# Patient Record
Sex: MALE | Race: WHITE | NOT HISPANIC OR LATINO | URBAN - METROPOLITAN AREA
[De-identification: names, ages, dates, MRNs, and addresses within clinical notes are randomized per-mention and may not be internally consistent; named-entity substitution may affect disease eponyms.]

---

## 2019-12-20 ENCOUNTER — EMERGENCY (EMERGENCY)
Facility: HOSPITAL | Age: 69
LOS: 1 days | Discharge: HOME | End: 2019-12-20
Attending: EMERGENCY MEDICINE
Payer: MEDICARE

## 2019-12-20 VITALS
SYSTOLIC BLOOD PRESSURE: 162 MMHG | TEMPERATURE: 98 F | DIASTOLIC BLOOD PRESSURE: 102 MMHG | HEART RATE: 78 BPM | RESPIRATION RATE: 16 BRPM | OXYGEN SATURATION: 99 %

## 2019-12-20 VITALS
DIASTOLIC BLOOD PRESSURE: 108 MMHG | WEIGHT: 149.91 LBS | SYSTOLIC BLOOD PRESSURE: 171 MMHG | RESPIRATION RATE: 18 BRPM | HEART RATE: 72 BPM | OXYGEN SATURATION: 100 % | TEMPERATURE: 98 F | HEIGHT: 69.5 IN

## 2019-12-20 LAB
ALBUMIN SERPL ELPH-MCNC: 4.8 G/DL — SIGNIFICANT CHANGE UP (ref 3.3–5)
ALP SERPL-CCNC: 72 U/L — SIGNIFICANT CHANGE UP (ref 40–120)
ALT FLD-CCNC: 19 U/L — SIGNIFICANT CHANGE UP (ref 10–45)
ANION GAP SERPL CALC-SCNC: 14 MMOL/L — SIGNIFICANT CHANGE UP (ref 5–17)
AST SERPL-CCNC: 27 U/L — SIGNIFICANT CHANGE UP (ref 10–40)
BASE EXCESS BLDV CALC-SCNC: 2.8 MMOL/L — HIGH (ref -2–2)
BASOPHILS # BLD AUTO: 0.04 K/UL — SIGNIFICANT CHANGE UP (ref 0–0.2)
BASOPHILS NFR BLD AUTO: 0.6 % — SIGNIFICANT CHANGE UP (ref 0–2)
BILIRUB SERPL-MCNC: 0.9 MG/DL — SIGNIFICANT CHANGE UP (ref 0.2–1.2)
BUN SERPL-MCNC: 15 MG/DL — SIGNIFICANT CHANGE UP (ref 7–23)
CA-I SERPL-SCNC: 1.24 MMOL/L — SIGNIFICANT CHANGE UP (ref 1.12–1.3)
CALCIUM SERPL-MCNC: 10.4 MG/DL — SIGNIFICANT CHANGE UP (ref 8.4–10.5)
CHLORIDE BLDV-SCNC: 102 MMOL/L — SIGNIFICANT CHANGE UP (ref 96–108)
CHLORIDE SERPL-SCNC: 97 MMOL/L — SIGNIFICANT CHANGE UP (ref 96–108)
CO2 BLDV-SCNC: 31 MMOL/L — HIGH (ref 22–30)
CO2 SERPL-SCNC: 25 MMOL/L — SIGNIFICANT CHANGE UP (ref 22–31)
CREAT SERPL-MCNC: 1.11 MG/DL — SIGNIFICANT CHANGE UP (ref 0.5–1.3)
EOSINOPHIL # BLD AUTO: 0.14 K/UL — SIGNIFICANT CHANGE UP (ref 0–0.5)
EOSINOPHIL NFR BLD AUTO: 2 % — SIGNIFICANT CHANGE UP (ref 0–6)
GAS PNL BLDV: 140 MMOL/L — SIGNIFICANT CHANGE UP (ref 135–145)
GAS PNL BLDV: SIGNIFICANT CHANGE UP
GAS PNL BLDV: SIGNIFICANT CHANGE UP
GLUCOSE BLDV-MCNC: 88 MG/DL — SIGNIFICANT CHANGE UP (ref 70–99)
GLUCOSE SERPL-MCNC: 92 MG/DL — SIGNIFICANT CHANGE UP (ref 70–99)
HCO3 BLDV-SCNC: 29 MMOL/L — SIGNIFICANT CHANGE UP (ref 21–29)
HCT VFR BLD CALC: 48.6 % — SIGNIFICANT CHANGE UP (ref 39–50)
HCT VFR BLDA CALC: 51 % — HIGH (ref 39–50)
HGB BLD CALC-MCNC: 16.6 G/DL — SIGNIFICANT CHANGE UP (ref 13–17)
HGB BLD-MCNC: 16.3 G/DL — SIGNIFICANT CHANGE UP (ref 13–17)
IMM GRANULOCYTES NFR BLD AUTO: 0.1 % — SIGNIFICANT CHANGE UP (ref 0–1.5)
LACTATE BLDV-MCNC: 1.9 MMOL/L — SIGNIFICANT CHANGE UP (ref 0.7–2)
LIDOCAIN IGE QN: 28 U/L — SIGNIFICANT CHANGE UP (ref 7–60)
LYMPHOCYTES # BLD AUTO: 1.29 K/UL — SIGNIFICANT CHANGE UP (ref 1–3.3)
LYMPHOCYTES # BLD AUTO: 18.7 % — SIGNIFICANT CHANGE UP (ref 13–44)
MCHC RBC-ENTMCNC: 30.4 PG — SIGNIFICANT CHANGE UP (ref 27–34)
MCHC RBC-ENTMCNC: 33.5 GM/DL — SIGNIFICANT CHANGE UP (ref 32–36)
MCV RBC AUTO: 90.7 FL — SIGNIFICANT CHANGE UP (ref 80–100)
MONOCYTES # BLD AUTO: 0.51 K/UL — SIGNIFICANT CHANGE UP (ref 0–0.9)
MONOCYTES NFR BLD AUTO: 7.4 % — SIGNIFICANT CHANGE UP (ref 2–14)
NEUTROPHILS # BLD AUTO: 4.91 K/UL — SIGNIFICANT CHANGE UP (ref 1.8–7.4)
NEUTROPHILS NFR BLD AUTO: 71.2 % — SIGNIFICANT CHANGE UP (ref 43–77)
NRBC # BLD: 0 /100 WBCS — SIGNIFICANT CHANGE UP (ref 0–0)
PCO2 BLDV: 53 MMHG — HIGH (ref 35–50)
PH BLDV: 7.36 — SIGNIFICANT CHANGE UP (ref 7.35–7.45)
PLATELET # BLD AUTO: 193 K/UL — SIGNIFICANT CHANGE UP (ref 150–400)
PO2 BLDV: 22 MMHG — LOW (ref 25–45)
POTASSIUM BLDV-SCNC: 3.7 MMOL/L — SIGNIFICANT CHANGE UP (ref 3.5–5.3)
POTASSIUM SERPL-MCNC: 3.7 MMOL/L — SIGNIFICANT CHANGE UP (ref 3.5–5.3)
POTASSIUM SERPL-SCNC: 3.7 MMOL/L — SIGNIFICANT CHANGE UP (ref 3.5–5.3)
PROT SERPL-MCNC: 8.3 G/DL — SIGNIFICANT CHANGE UP (ref 6–8.3)
RBC # BLD: 5.36 M/UL — SIGNIFICANT CHANGE UP (ref 4.2–5.8)
RBC # FLD: 13 % — SIGNIFICANT CHANGE UP (ref 10.3–14.5)
SAO2 % BLDV: 29 % — LOW (ref 67–88)
SODIUM SERPL-SCNC: 136 MMOL/L — SIGNIFICANT CHANGE UP (ref 135–145)
WBC # BLD: 6.9 K/UL — SIGNIFICANT CHANGE UP (ref 3.8–10.5)
WBC # FLD AUTO: 6.9 K/UL — SIGNIFICANT CHANGE UP (ref 3.8–10.5)

## 2019-12-20 PROCEDURE — 83605 ASSAY OF LACTIC ACID: CPT

## 2019-12-20 PROCEDURE — 84132 ASSAY OF SERUM POTASSIUM: CPT

## 2019-12-20 PROCEDURE — 74177 CT ABD & PELVIS W/CONTRAST: CPT | Mod: 26

## 2019-12-20 PROCEDURE — 82803 BLOOD GASES ANY COMBINATION: CPT

## 2019-12-20 PROCEDURE — 84295 ASSAY OF SERUM SODIUM: CPT

## 2019-12-20 PROCEDURE — 99284 EMERGENCY DEPT VISIT MOD MDM: CPT | Mod: 25

## 2019-12-20 PROCEDURE — 82947 ASSAY GLUCOSE BLOOD QUANT: CPT

## 2019-12-20 PROCEDURE — 85014 HEMATOCRIT: CPT

## 2019-12-20 PROCEDURE — 80053 COMPREHEN METABOLIC PANEL: CPT

## 2019-12-20 PROCEDURE — 85027 COMPLETE CBC AUTOMATED: CPT

## 2019-12-20 PROCEDURE — 74177 CT ABD & PELVIS W/CONTRAST: CPT

## 2019-12-20 PROCEDURE — 82435 ASSAY OF BLOOD CHLORIDE: CPT

## 2019-12-20 PROCEDURE — 81001 URINALYSIS AUTO W/SCOPE: CPT

## 2019-12-20 PROCEDURE — 82330 ASSAY OF CALCIUM: CPT

## 2019-12-20 PROCEDURE — 99284 EMERGENCY DEPT VISIT MOD MDM: CPT | Mod: GC

## 2019-12-20 PROCEDURE — 83690 ASSAY OF LIPASE: CPT

## 2019-12-20 NOTE — ED PROVIDER NOTE - CLINICAL SUMMARY MEDICAL DECISION MAKING FREE TEXT BOX
69M h/o SBO (conservative treatment) 4-5y ago, constipation, Crohns not on meds, p/w upper abd pain x 4-5d. nontoxic appearing with nonfocal abd exam. no pulseating masses on exam. r/o sbo v constiapiton v crohns flare (less likely)

## 2019-12-20 NOTE — ED PROVIDER NOTE - PHYSICAL EXAMINATION
Vitals: WNL  Gen: laying comfortably in NAD  Head: NCAT  ENT: sclerae white, anicterus, moist mucous membranes. No exudates.  CV: RRR. Audible S1 and S2. No murmurs, rubs, gallops, S3, nor S4, 2+ radial and DP pulses   Pulm: Clear to auscultation bilaterally. No wheezes, rales, or rhonchi  Abd: soft, normoactive BS x4, NTND, no rebound, no guarding, no rashes  Musculoskeletal:  No peripheral edema  Skin: no lesions or scars noted  Neurologic: AAOx3  : no CVA tenderness  Psych: no SI/HI

## 2019-12-20 NOTE — ED PROVIDER NOTE - NSFOLLOWUPINSTRUCTIONS_ED_ALL_ED_FT
1) You have acute on chronic Crohn's flare. Please follow-up with your GI within the next 3 days to discuss if you need to be started on long term medications.  Please call today or tomorrow for an appointment.  If you cannot follow-up with your doctor(s), please return to the ED for any urgent issues.  2) If you have any worsening of symptoms or any other concerns please return to the ED immediately.  3) Please continue taking your home medications as directed.  4) You may have been given a copy of your labs and/or imaging.  Please go over these with your primary care doctor.

## 2019-12-20 NOTE — ED ADULT NURSE NOTE - OBJECTIVE STATEMENT
Patient is a 70 y/o male c/o upper abdominal pain x3 days. States pain is in bilateral upper abdominal quadrants and radiates to flank. States pain is subtle but GI told him to come to ED r/t hx of SBO 4 years ago. States he did not receive surgery for SBO. States last bowel movement was Sunday which was formed/some diarrhea. States he was diagnosed with crohns but has had no pain/issues r/t it since. Hx HTN, did not take night medications. Denies CP, SOB, N/V/D, numbness, tingling, fever, cough, chills, weakness, dizziness, headache. A&Ox3. Breathing unlabored and spontaneously. Abdomen soft, nontender and nondistended. Full ROM and strength of extremities. Safety and comfort measures provided. Family at bedside.

## 2019-12-20 NOTE — ED PROVIDER NOTE - PATIENT PORTAL LINK FT
You can access the FollowMyHealth Patient Portal offered by [facility] by registering at the following website: [email or web address]. By joining [facility]’s FollowMyHealth portal, you will also be able to view your health information using other applications (apps) compatible with our system.

## 2019-12-20 NOTE — ED PROVIDER NOTE - NS ED ROS FT
Gen: No fever, normal appetite  Eyes: No eye irritation or discharge  ENT: No earpain, congestion, sore throat  Resp: No cough or trouble breathing  Cardiovascular: No chest pain or palpitation  Gastroenteric: +abd pain  : No dysuria  MS: No joint or muscle pain  Skin: No rashes  Neuro: No headache  Remainder negative, except as per the HPI

## 2019-12-20 NOTE — ED PROVIDER NOTE - OBJECTIVE STATEMENT
69M h/o SBO (conservative treatment) 4-5y ago, constipation, Crohns not on meds, p/w upper abd pain x 4-5d. pain intermittent, mild in nature, radaites around abd to back, not a/w hematuria, n/v. decreased flatus. pain not a/w eating, only positional. has not had BM x 4-5d which pt says is typical for him. denies melena. only surgery - prostatectomy for prostate ca (not in remission)

## 2019-12-20 NOTE — ED ADULT NURSE REASSESSMENT NOTE - NS ED NURSE REASSESS COMMENT FT1
Report received from Whitney GARCIA. Pt A&Ox3 calm and cooperative, Pt ambulatory to bathroom without assistance, Pt states he has no ABD pain or urinary symptoms, Pt wasn't able to move bowels since Sunday. Denies chest pain, sob, ha, n/v/d, abdominal pain, f/c, urinary symptoms, hematuria. Upon examination, full facial symmetry,  no JVD or trach deviation, lung sounds clear and adequate chest rise, S1 and S2 heart sounds present, +2 pulses in all extremities with full ROM and equal strength, cap refill <2 seconds, ABD soft, non distended and non tender.

## 2019-12-20 NOTE — ED PROVIDER NOTE - PROGRESS NOTE DETAILS
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 pt continues to be well appearing without abd pain. pt requesting to leave. pt understands he needs close fu with GI. wioll dc home. Talha Curiel MD: was able to reach Dr Pete after pt had left the ED. LM with pt's cell for return call back for pharmacy information. Dr Pete recommending prednisone 30mg x 5d and f/u with him in office.

## 2019-12-20 NOTE — ED PROVIDER NOTE - ATTENDING CONTRIBUTION TO CARE
I have seen and evaluated this patient with the resident.   I agree with the findings  unless other wise stated.  I have made appropriate changes in documentations where needed, After my face to face bedside evaluation, I am further  notinM h/o SBO (conservative treatment) 4-5y ago, constipation, Crohns not on meds, p/w upper abd pain x 4-5d. pain intermittent, mild in nature, radaites around abd to back, not a/w hematuria, n/v. decreased flatus. pain not a/w eating, only positional. has not had BM x 4-5d which pt says is typical for him. denies melena. only surgery - prostatectomy for prostate ca (not in remission) Pt afebrile affirms abdomen pain No fever no vomiting Abdomen soft not distended has tender lower abdome Rt>Lt CT reveals Crohns disease changes pt declines ion hospital care will d/w PMD --Manolo

## 2019-12-21 LAB
APPEARANCE UR: CLEAR — SIGNIFICANT CHANGE UP
BACTERIA # UR AUTO: NEGATIVE — SIGNIFICANT CHANGE UP
BILIRUB UR-MCNC: NEGATIVE — SIGNIFICANT CHANGE UP
COLOR SPEC: COLORLESS — SIGNIFICANT CHANGE UP
DIFF PNL FLD: NEGATIVE — SIGNIFICANT CHANGE UP
EPI CELLS # UR: 0 /HPF — SIGNIFICANT CHANGE UP
GLUCOSE UR QL: NEGATIVE — SIGNIFICANT CHANGE UP
HYALINE CASTS # UR AUTO: 0 /LPF — SIGNIFICANT CHANGE UP (ref 0–2)
KETONES UR-MCNC: ABNORMAL
LEUKOCYTE ESTERASE UR-ACNC: NEGATIVE — SIGNIFICANT CHANGE UP
NITRITE UR-MCNC: NEGATIVE — SIGNIFICANT CHANGE UP
PH UR: 6.5 — SIGNIFICANT CHANGE UP (ref 5–8)
PROT UR-MCNC: NEGATIVE — SIGNIFICANT CHANGE UP
RBC CASTS # UR COMP ASSIST: 0 /HPF — SIGNIFICANT CHANGE UP (ref 0–4)
SP GR SPEC: 1.01 — LOW (ref 1.01–1.02)
UROBILINOGEN FLD QL: NEGATIVE — SIGNIFICANT CHANGE UP
WBC UR QL: 0 /HPF — SIGNIFICANT CHANGE UP (ref 0–5)

## 2020-11-03 ENCOUNTER — TRANSCRIPTION ENCOUNTER (OUTPATIENT)
Age: 70
End: 2020-11-03

## 2023-09-19 ENCOUNTER — OFFICE (OUTPATIENT)
Dept: URBAN - METROPOLITAN AREA CLINIC 70 | Facility: CLINIC | Age: 73
Setting detail: OPHTHALMOLOGY
End: 2023-09-19
Payer: MEDICARE

## 2023-09-19 DIAGNOSIS — H35.54: ICD-10-CM

## 2023-09-19 DIAGNOSIS — H25.13: ICD-10-CM

## 2023-09-19 DIAGNOSIS — H16.223: ICD-10-CM

## 2023-09-19 DIAGNOSIS — H40.033: ICD-10-CM

## 2023-09-19 DIAGNOSIS — H35.40: ICD-10-CM

## 2023-09-19 PROCEDURE — 92014 COMPRE OPH EXAM EST PT 1/>: CPT | Performed by: OPHTHALMOLOGY

## 2023-09-19 ASSESSMENT — REFRACTION_CURRENTRX
OS_CYLINDER: SPH
OS_SPHERE: +2.00
OS_OVR_VA: 20/
OD_SPHERE: +2.00
OD_CYLINDER: SPH
OD_VPRISM_DIRECTION: SV
OS_VPRISM_DIRECTION: SV
OD_OVR_VA: 20/

## 2023-09-19 ASSESSMENT — REFRACTION_AUTOREFRACTION
OS_AXIS: 083
OS_SPHERE: +2.75
OD_SPHERE: +2.50
OD_AXIS: 101
OD_CYLINDER: -1.75
OS_CYLINDER: -2.25

## 2023-09-19 ASSESSMENT — KERATOMETRY
OS_K1POWER_DIOPTERS: 45.75
OS_AXISANGLE_DEGREES: 149
OD_K1POWER_DIOPTERS: 45.25
OS_K2POWER_DIOPTERS: 46.25
OD_K2POWER_DIOPTERS: 45.75
OD_AXISANGLE_DEGREES: 044

## 2023-09-19 ASSESSMENT — REFRACTION_MANIFEST
OS_SPHERE: +2.00
OD_SPHERE: +1.75
OS_AXIS: 85
OD_CYLINDER: -0.75
OS_ADD: +2.25
OS_VA1: 20/20
OU_VA: 20/20
OD_VA2: 20/20
OD_VA1: 20/20
OS_VA2: 20/20
OD_ADD: +2.25
OD_AXIS: 100
OS_CYLINDER: -0.75

## 2023-09-19 ASSESSMENT — SPHEQUIV_DERIVED
OS_SPHEQUIV: 1.625
OD_SPHEQUIV: 1.375
OS_SPHEQUIV: 1.625
OD_SPHEQUIV: 1.625

## 2023-09-19 ASSESSMENT — AXIALLENGTH_DERIVED
OD_AL: 22.2991
OS_AL: 22.1362
OD_AL: 22.3865
OS_AL: 22.1362

## 2023-09-19 ASSESSMENT — VISUAL ACUITY
OS_BCVA: 20/40-2
OD_BCVA: 20/50

## 2023-09-19 ASSESSMENT — SUPERFICIAL PUNCTATE KERATITIS (SPK)
OD_SPK: 1+
OS_SPK: 1+

## 2023-09-19 ASSESSMENT — CONFRONTATIONAL VISUAL FIELD TEST (CVF)
OS_FINDINGS: FULL
OD_FINDINGS: FULL

## 2024-01-08 ENCOUNTER — RX ONLY (RX ONLY)
Age: 74
End: 2024-01-08

## 2024-01-08 ENCOUNTER — OFFICE (OUTPATIENT)
Dept: URBAN - METROPOLITAN AREA CLINIC 63 | Facility: CLINIC | Age: 74
Setting detail: OPHTHALMOLOGY
End: 2024-01-08
Payer: MEDICARE

## 2024-01-08 DIAGNOSIS — H10.45: ICD-10-CM

## 2024-01-08 PROCEDURE — 92012 INTRM OPH EXAM EST PATIENT: CPT | Performed by: STUDENT IN AN ORGANIZED HEALTH CARE EDUCATION/TRAINING PROGRAM

## 2024-01-08 ASSESSMENT — REFRACTION_MANIFEST
OS_SPHERE: +2.00
OD_VA2: 20/20
OD_SPHERE: +1.75
OS_AXIS: 85
OD_ADD: +2.25
OS_VA2: 20/20
OD_AXIS: 100
OD_CYLINDER: -0.75
OU_VA: 20/20
OD_VA1: 20/20
OS_VA1: 20/20
OS_CYLINDER: -0.75
OS_ADD: +2.25

## 2024-01-08 ASSESSMENT — REFRACTION_AUTOREFRACTION
OD_SPHERE: +2.25
OS_AXIS: 070
OS_SPHERE: +2.50
OD_CYLINDER: -1.25
OD_AXIS: 107
OS_CYLINDER: -1.50

## 2024-01-08 ASSESSMENT — REFRACTION_CURRENTRX
OD_OVR_VA: 20/
OD_SPHERE: +2.00
OS_SPHERE: +2.00
OS_CYLINDER: SPH
OD_CYLINDER: SPH
OS_VPRISM_DIRECTION: SV
OD_VPRISM_DIRECTION: SV
OS_OVR_VA: 20/

## 2024-01-08 ASSESSMENT — SPHEQUIV_DERIVED
OS_SPHEQUIV: 1.75
OD_SPHEQUIV: 1.375
OS_SPHEQUIV: 1.625
OD_SPHEQUIV: 1.625

## 2024-01-08 ASSESSMENT — CONFRONTATIONAL VISUAL FIELD TEST (CVF)
OD_FINDINGS: FULL
OS_FINDINGS: FULL

## 2024-01-08 ASSESSMENT — SUPERFICIAL PUNCTATE KERATITIS (SPK)
OS_SPK: 1+
OD_SPK: 1+

## 2024-09-25 ENCOUNTER — OFFICE (OUTPATIENT)
Dept: URBAN - METROPOLITAN AREA CLINIC 70 | Facility: CLINIC | Age: 74
Setting detail: OPHTHALMOLOGY
End: 2024-09-25
Payer: MEDICARE

## 2024-09-25 DIAGNOSIS — H40.033: ICD-10-CM

## 2024-09-25 DIAGNOSIS — H35.54: ICD-10-CM

## 2024-09-25 DIAGNOSIS — H25.13: ICD-10-CM

## 2024-09-25 DIAGNOSIS — H10.45: ICD-10-CM

## 2024-09-25 DIAGNOSIS — H16.223: ICD-10-CM

## 2024-09-25 DIAGNOSIS — H35.40: ICD-10-CM

## 2024-09-25 PROCEDURE — 92014 COMPRE OPH EXAM EST PT 1/>: CPT | Performed by: OPHTHALMOLOGY

## 2024-09-25 ASSESSMENT — CONFRONTATIONAL VISUAL FIELD TEST (CVF)
OD_FINDINGS: FULL
OS_FINDINGS: FULL

## 2024-11-07 ENCOUNTER — OFFICE (OUTPATIENT)
Dept: URBAN - METROPOLITAN AREA CLINIC 70 | Facility: CLINIC | Age: 74
Setting detail: OPHTHALMOLOGY
End: 2024-11-07
Payer: MEDICARE

## 2024-11-07 ENCOUNTER — RX ONLY (RX ONLY)
Age: 74
End: 2024-11-07

## 2024-11-07 DIAGNOSIS — H10.45: ICD-10-CM

## 2024-11-07 DIAGNOSIS — H40.033: ICD-10-CM

## 2024-11-07 DIAGNOSIS — H16.223: ICD-10-CM

## 2024-11-07 PROCEDURE — 92012 INTRM OPH EXAM EST PATIENT: CPT | Performed by: OPHTHALMOLOGY

## 2024-11-07 PROCEDURE — 92133 CPTRZD OPH DX IMG PST SGM ON: CPT | Performed by: OPHTHALMOLOGY

## 2024-11-07 ASSESSMENT — KERATOMETRY
OS_K2POWER_DIOPTERS: 46.25
OD_AXISANGLE_DEGREES: 026
OS_AXISANGLE_DEGREES: 141
OS_K1POWER_DIOPTERS: 45.75
OD_K2POWER_DIOPTERS: 46.00
OD_K1POWER_DIOPTERS: 45.50

## 2024-11-07 ASSESSMENT — REFRACTION_CURRENTRX
OS_ADD: +1.75
OS_OVR_VA: 20/
OS_SPHERE: +2.00
OS_SPHERE: +2.00
OD_CYLINDER: SPH
OD_OVR_VA: 20/
OS_CYLINDER: -0.75
OS_CYLINDER: SPH
OD_SPHERE: +2.50
OD_VPRISM_DIRECTION: SV
OS_AXIS: 078
OS_VPRISM_DIRECTION: SV
OD_CYLINDER: -0.75
OD_SPHERE: +2.00
OD_ADD: +1.25
OS_OVR_VA: 20/
OD_OVR_VA: 20/
OD_AXIS: 084

## 2024-11-07 ASSESSMENT — VISUAL ACUITY
OD_BCVA: 20/30+2
OS_BCVA: 20/30-

## 2024-11-07 ASSESSMENT — REFRACTION_AUTOREFRACTION
OS_AXIS: 077
OS_SPHERE: +2.75
OD_CYLINDER: -1.50
OD_SPHERE: +2.25
OS_CYLINDER: -1.75
OD_AXIS: 098

## 2024-11-07 ASSESSMENT — CONFRONTATIONAL VISUAL FIELD TEST (CVF)
OD_FINDINGS: FULL
OS_FINDINGS: FULL

## 2024-11-07 ASSESSMENT — REFRACTION_MANIFEST
OS_CYLINDER: -0.75
OD_CYLINDER: -0.75
OS_VA2: 20/20
OS_ADD: +2.25
OD_ADD: +2.25
OS_AXIS: 85
OD_AXIS: 100
OD_VA1: 20/20
OD_SPHERE: +1.75
OD_VA2: 20/20
OS_SPHERE: +2.00
OS_VA1: 20/20
OU_VA: 20/20

## 2024-11-07 ASSESSMENT — SUPERFICIAL PUNCTATE KERATITIS (SPK)
OD_SPK: 1+
OS_SPK: 1+

## 2025-04-01 ENCOUNTER — OFFICE (OUTPATIENT)
Dept: URBAN - METROPOLITAN AREA CLINIC 70 | Facility: CLINIC | Age: 75
Setting detail: OPHTHALMOLOGY
End: 2025-04-01
Payer: MEDICARE

## 2025-04-01 DIAGNOSIS — H10.45: ICD-10-CM

## 2025-04-01 DIAGNOSIS — H40.033: ICD-10-CM

## 2025-04-01 DIAGNOSIS — H16.223: ICD-10-CM

## 2025-04-01 PROCEDURE — 92012 INTRM OPH EXAM EST PATIENT: CPT | Performed by: OPHTHALMOLOGY

## 2025-04-01 PROCEDURE — 92083 EXTENDED VISUAL FIELD XM: CPT | Performed by: OPHTHALMOLOGY

## 2025-04-01 ASSESSMENT — REFRACTION_MANIFEST
OD_AXIS: 100
OS_SPHERE: +2.00
OD_SPHERE: +1.75
OS_ADD: +2.25
OS_VA2: 20/20
OS_VA1: 20/20
OD_CYLINDER: -0.75
OS_AXIS: 85
OU_VA: 20/20
OD_VA1: 20/20
OD_VA2: 20/20
OS_CYLINDER: -0.75
OD_ADD: +2.25

## 2025-04-01 ASSESSMENT — REFRACTION_CURRENTRX
OS_OVR_VA: 20/
OD_AXIS: 084
OD_CYLINDER: SPH
OD_VPRISM_DIRECTION: SV
OS_ADD: +1.75
OD_OVR_VA: 20/
OS_SPHERE: +2.00
OD_OVR_VA: 20/
OD_ADD: +1.25
OS_CYLINDER: -0.75
OD_SPHERE: +2.00
OD_CYLINDER: -0.75
OS_AXIS: 078
OS_VPRISM_DIRECTION: SV
OS_OVR_VA: 20/
OS_SPHERE: +2.00
OD_SPHERE: +2.50
OS_CYLINDER: SPH

## 2025-04-01 ASSESSMENT — KERATOMETRY
OD_AXISANGLE_DEGREES: 026
OD_K2POWER_DIOPTERS: 46.00
OS_K1POWER_DIOPTERS: 45.75
OS_AXISANGLE_DEGREES: 141
OD_K1POWER_DIOPTERS: 45.50
OS_K2POWER_DIOPTERS: 46.25

## 2025-04-01 ASSESSMENT — REFRACTION_AUTOREFRACTION
OS_AXIS: 077
OD_CYLINDER: -1.50
OD_SPHERE: +2.25
OD_AXIS: 098
OS_SPHERE: +2.75
OS_CYLINDER: -1.75

## 2025-04-01 ASSESSMENT — CONFRONTATIONAL VISUAL FIELD TEST (CVF)
OD_FINDINGS: FULL
OS_FINDINGS: FULL

## 2025-04-01 ASSESSMENT — SUPERFICIAL PUNCTATE KERATITIS (SPK)
OS_SPK: 1+
OD_SPK: 1+

## 2025-04-01 ASSESSMENT — VISUAL ACUITY
OD_BCVA: 20/30+2
OS_BCVA: 20/30-

## 2025-06-05 ENCOUNTER — OFFICE (OUTPATIENT)
Dept: URBAN - METROPOLITAN AREA CLINIC 70 | Facility: CLINIC | Age: 75
Setting detail: OPHTHALMOLOGY
End: 2025-06-05
Payer: MEDICARE

## 2025-06-05 DIAGNOSIS — H40.1112: ICD-10-CM

## 2025-06-05 PROCEDURE — 66030 INJECTION TREATMENT OF EYE: CPT | Mod: RT | Performed by: OPHTHALMOLOGY

## 2025-06-05 ASSESSMENT — SUPERFICIAL PUNCTATE KERATITIS (SPK)
OD_SPK: 1+
OS_SPK: 1+

## 2025-06-05 ASSESSMENT — CONFRONTATIONAL VISUAL FIELD TEST (CVF)
OS_FINDINGS: FULL
OD_FINDINGS: FULL

## 2025-06-06 ASSESSMENT — REFRACTION_MANIFEST
OU_VA: 20/20
OD_VA2: 20/20
OS_CYLINDER: -0.75
OD_SPHERE: +1.75
OS_VA2: 20/20
OS_SPHERE: +2.00
OD_CYLINDER: -0.75
OD_ADD: +2.25
OD_AXIS: 100
OS_ADD: +2.25
OS_AXIS: 85
OS_VA1: 20/20
OD_VA1: 20/20

## 2025-06-06 ASSESSMENT — REFRACTION_CURRENTRX
OS_SPHERE: +2.00
OD_CYLINDER: SPH
OS_SPHERE: +2.00
OD_VPRISM_DIRECTION: SV
OS_AXIS: 078
OD_OVR_VA: 20/
OS_ADD: +1.75
OD_SPHERE: +2.00
OD_CYLINDER: -0.75
OS_OVR_VA: 20/
OD_SPHERE: +2.50
OS_CYLINDER: SPH
OS_OVR_VA: 20/
OS_VPRISM_DIRECTION: SV
OD_ADD: +1.25
OD_OVR_VA: 20/
OD_AXIS: 084
OS_CYLINDER: -0.75

## 2025-06-06 ASSESSMENT — REFRACTION_AUTOREFRACTION
OD_CYLINDER: -1.50
OD_AXIS: 098
OD_SPHERE: +2.25
OS_CYLINDER: -1.75
OS_SPHERE: +2.75
OS_AXIS: 077

## 2025-06-06 ASSESSMENT — VISUAL ACUITY
OS_BCVA: 20/30-
OD_BCVA: 20/30+2

## 2025-06-06 ASSESSMENT — KERATOMETRY
OS_AXISANGLE_DEGREES: 141
OD_AXISANGLE_DEGREES: 026
OS_K1POWER_DIOPTERS: 45.75
OD_K2POWER_DIOPTERS: 46.00
OS_K2POWER_DIOPTERS: 46.25
OD_K1POWER_DIOPTERS: 45.50

## 2025-07-02 ENCOUNTER — OFFICE (OUTPATIENT)
Dept: URBAN - METROPOLITAN AREA CLINIC 70 | Facility: CLINIC | Age: 75
Setting detail: OPHTHALMOLOGY
End: 2025-07-02
Payer: MEDICARE

## 2025-07-02 DIAGNOSIS — H40.1132: ICD-10-CM

## 2025-07-02 PROCEDURE — 66030 INJECTION TREATMENT OF EYE: CPT | Performed by: OPHTHALMOLOGY

## 2025-07-02 ASSESSMENT — REFRACTION_AUTOREFRACTION
OD_AXIS: 098
OD_CYLINDER: -1.50
OS_CYLINDER: -1.75
OS_AXIS: 077
OS_SPHERE: +2.75
OD_SPHERE: +2.25

## 2025-07-02 ASSESSMENT — KERATOMETRY
OS_K2POWER_DIOPTERS: 46.25
OS_K1POWER_DIOPTERS: 45.75
OS_AXISANGLE_DEGREES: 141
OD_K1POWER_DIOPTERS: 45.50
OD_K2POWER_DIOPTERS: 46.00
OD_AXISANGLE_DEGREES: 026

## 2025-07-02 ASSESSMENT — REFRACTION_MANIFEST
OS_SPHERE: +2.00
OD_VA2: 20/20
OD_ADD: +2.25
OS_VA2: 20/20
OD_CYLINDER: -0.75
OS_CYLINDER: -0.75
OS_AXIS: 85
OD_SPHERE: +1.75
OU_VA: 20/20
OD_VA1: 20/20
OS_ADD: +2.25
OS_VA1: 20/20
OD_AXIS: 100

## 2025-07-02 ASSESSMENT — CONFRONTATIONAL VISUAL FIELD TEST (CVF)
OD_FINDINGS: FULL
OS_FINDINGS: FULL

## 2025-07-02 ASSESSMENT — REFRACTION_CURRENTRX
OD_CYLINDER: SPH
OS_SPHERE: +2.00
OD_OVR_VA: 20/
OS_VPRISM_DIRECTION: SV
OD_OVR_VA: 20/
OS_ADD: +1.75
OD_VPRISM_DIRECTION: SV
OS_SPHERE: +2.00
OS_OVR_VA: 20/
OS_AXIS: 078
OS_CYLINDER: SPH
OS_OVR_VA: 20/
OD_SPHERE: +2.00
OS_CYLINDER: -0.75
OD_CYLINDER: -0.75
OD_ADD: +1.25
OD_AXIS: 084
OD_SPHERE: +2.50

## 2025-07-02 ASSESSMENT — VISUAL ACUITY
OD_BCVA: 20/30+2
OS_BCVA: 20/30-

## 2025-07-02 ASSESSMENT — SUPERFICIAL PUNCTATE KERATITIS (SPK)
OS_SPK: 1+
OD_SPK: 1+

## 2025-07-08 ENCOUNTER — OFFICE (OUTPATIENT)
Dept: URBAN - METROPOLITAN AREA CLINIC 70 | Facility: CLINIC | Age: 75
Setting detail: OPHTHALMOLOGY
End: 2025-07-08
Payer: MEDICARE

## 2025-07-08 DIAGNOSIS — H40.1132: ICD-10-CM

## 2025-07-08 PROCEDURE — 99024 POSTOP FOLLOW-UP VISIT: CPT | Performed by: OPHTHALMOLOGY

## 2025-07-08 ASSESSMENT — REFRACTION_CURRENTRX
OS_CYLINDER: SPH
OS_SPHERE: +2.00
OD_OVR_VA: 20/
OS_CYLINDER: -0.75
OD_CYLINDER: -0.75
OD_SPHERE: +2.50
OS_ADD: +1.75
OD_SPHERE: +2.00
OS_SPHERE: +2.00
OD_CYLINDER: SPH
OD_VPRISM_DIRECTION: SV
OS_AXIS: 078
OD_ADD: +1.25
OS_OVR_VA: 20/
OS_OVR_VA: 20/
OS_VPRISM_DIRECTION: SV
OD_AXIS: 084
OD_OVR_VA: 20/

## 2025-07-08 ASSESSMENT — REFRACTION_MANIFEST
OD_ADD: +2.25
OS_ADD: +2.25
OS_VA1: 20/20
OS_CYLINDER: -0.75
OS_VA2: 20/20
OD_SPHERE: +1.75
OD_CYLINDER: -0.75
OS_SPHERE: +2.00
OS_AXIS: 85
OD_VA1: 20/20
OU_VA: 20/20
OD_AXIS: 100
OD_VA2: 20/20

## 2025-07-08 ASSESSMENT — KERATOMETRY
OS_AXISANGLE_DEGREES: 141
OS_K2POWER_DIOPTERS: 46.25
OD_K2POWER_DIOPTERS: 46.00
OD_K1POWER_DIOPTERS: 45.50
OD_AXISANGLE_DEGREES: 026
OS_K1POWER_DIOPTERS: 45.75

## 2025-07-08 ASSESSMENT — REFRACTION_AUTOREFRACTION
OS_SPHERE: +2.75
OD_AXIS: 098
OS_AXIS: 077
OD_CYLINDER: -1.50
OS_CYLINDER: -1.75
OD_SPHERE: +2.25

## 2025-07-08 ASSESSMENT — SUPERFICIAL PUNCTATE KERATITIS (SPK)
OD_SPK: 1+
OS_SPK: 1+

## 2025-07-08 ASSESSMENT — CONFRONTATIONAL VISUAL FIELD TEST (CVF)
OD_FINDINGS: FULL
OS_FINDINGS: FULL

## 2025-07-08 ASSESSMENT — VISUAL ACUITY
OD_BCVA: 20/30+2
OS_BCVA: 20/30-

## 2025-08-12 ENCOUNTER — OFFICE (OUTPATIENT)
Dept: URBAN - METROPOLITAN AREA CLINIC 70 | Facility: CLINIC | Age: 75
Setting detail: OPHTHALMOLOGY
End: 2025-08-12
Payer: MEDICARE

## 2025-08-12 DIAGNOSIS — H40.1132: ICD-10-CM

## 2025-08-12 PROCEDURE — 92012 INTRM OPH EXAM EST PATIENT: CPT | Performed by: OPHTHALMOLOGY

## 2025-08-12 ASSESSMENT — REFRACTION_MANIFEST
OD_AXIS: 100
OD_VA1: 20/20
OS_ADD: +2.25
OD_CYLINDER: -0.75
OD_VA2: 20/20
OD_ADD: +2.25
OU_VA: 20/20
OS_VA2: 20/20
OS_CYLINDER: -0.75
OS_SPHERE: +2.00
OD_SPHERE: +1.75
OS_VA1: 20/20
OS_AXIS: 85

## 2025-08-12 ASSESSMENT — KERATOMETRY
OD_AXISANGLE_DEGREES: 026
OD_K1POWER_DIOPTERS: 45.50
OS_K1POWER_DIOPTERS: 45.75
OS_K2POWER_DIOPTERS: 46.25
OS_AXISANGLE_DEGREES: 141
OD_K2POWER_DIOPTERS: 46.00

## 2025-08-12 ASSESSMENT — TONOMETRY
OD_IOP_MMHG: 18
OS_IOP_MMHG: 17

## 2025-08-12 ASSESSMENT — REFRACTION_AUTOREFRACTION
OD_AXIS: 098
OS_CYLINDER: -1.75
OS_AXIS: 077
OS_SPHERE: +2.75
OD_SPHERE: +2.25
OD_CYLINDER: -1.50

## 2025-08-12 ASSESSMENT — REFRACTION_CURRENTRX
OS_OVR_VA: 20/
OD_VPRISM_DIRECTION: SV
OS_SPHERE: +2.00
OS_OVR_VA: 20/
OS_SPHERE: +2.00
OD_ADD: +1.25
OD_CYLINDER: -0.75
OS_CYLINDER: -0.75
OS_AXIS: 078
OS_VPRISM_DIRECTION: SV
OD_SPHERE: +2.00
OD_CYLINDER: SPH
OD_OVR_VA: 20/
OD_OVR_VA: 20/
OD_SPHERE: +2.50
OS_CYLINDER: SPH
OD_AXIS: 084
OS_ADD: +1.75

## 2025-08-12 ASSESSMENT — CONFRONTATIONAL VISUAL FIELD TEST (CVF)
OD_FINDINGS: FULL
OS_FINDINGS: FULL

## 2025-08-12 ASSESSMENT — VISUAL ACUITY
OS_BCVA: 20/30
OD_BCVA: 20/30+2

## 2025-08-12 ASSESSMENT — SUPERFICIAL PUNCTATE KERATITIS (SPK)
OS_SPK: 1+
OD_SPK: 1+